# Patient Record
Sex: FEMALE | Race: NATIVE HAWAIIAN OR OTHER PACIFIC ISLANDER | NOT HISPANIC OR LATINO | URBAN - METROPOLITAN AREA
[De-identification: names, ages, dates, MRNs, and addresses within clinical notes are randomized per-mention and may not be internally consistent; named-entity substitution may affect disease eponyms.]

---

## 2023-06-08 ENCOUNTER — EMERGENCY (EMERGENCY)
Facility: HOSPITAL | Age: 28
LOS: 1 days | Discharge: HOME | End: 2023-06-08
Admitting: EMERGENCY MEDICINE
Payer: COMMERCIAL

## 2023-06-08 VITALS
WEIGHT: 125 LBS | SYSTOLIC BLOOD PRESSURE: 101 MMHG | TEMPERATURE: 99 F | OXYGEN SATURATION: 96 % | RESPIRATION RATE: 18 BRPM | DIASTOLIC BLOOD PRESSURE: 64 MMHG | HEIGHT: 61 IN | HEART RATE: 115 BPM

## 2023-06-08 VITALS
HEART RATE: 102 BPM | RESPIRATION RATE: 17 BRPM | TEMPERATURE: 99 F | DIASTOLIC BLOOD PRESSURE: 69 MMHG | SYSTOLIC BLOOD PRESSURE: 101 MMHG | OXYGEN SATURATION: 100 %

## 2023-06-08 LAB
FLUAV AG NPH QL: SIGNIFICANT CHANGE UP
FLUBV AG NPH QL: SIGNIFICANT CHANGE UP
RSV RNA NPH QL NAA+NON-PROBE: SIGNIFICANT CHANGE UP
SARS-COV-2 RNA SPEC QL NAA+PROBE: SIGNIFICANT CHANGE UP

## 2023-06-08 PROCEDURE — 87637 SARSCOV2&INF A&B&RSV AMP PRB: CPT

## 2023-06-08 PROCEDURE — 99283 EMERGENCY DEPT VISIT LOW MDM: CPT | Mod: 25

## 2023-06-08 PROCEDURE — 96372 THER/PROPH/DIAG INJ SC/IM: CPT

## 2023-06-08 PROCEDURE — 99284 EMERGENCY DEPT VISIT MOD MDM: CPT

## 2023-06-08 RX ORDER — IBUPROFEN 200 MG
600 TABLET ORAL ONCE
Refills: 0 | Status: COMPLETED | OUTPATIENT
Start: 2023-06-08 | End: 2023-06-08

## 2023-06-08 RX ORDER — PENICILLIN V POTASIUM 500 MG/1
1 TABLET OROPHARYNGEAL
Qty: 20 | Refills: 0
Start: 2023-06-08 | End: 2023-06-17

## 2023-06-08 RX ORDER — DEXAMETHASONE 0.5 MG/5ML
10 ELIXIR ORAL ONCE
Refills: 0 | Status: COMPLETED | OUTPATIENT
Start: 2023-06-08 | End: 2023-06-08

## 2023-06-08 RX ADMIN — Medication 10 MILLIGRAM(S): at 16:24

## 2023-06-08 RX ADMIN — Medication 600 MILLIGRAM(S): at 16:05

## 2023-06-08 NOTE — ED ADULT NURSE NOTE - NSFALLUNIVINTERV_ED_ALL_ED
Bed/Stretcher in lowest position, wheels locked, appropriate side rails in place/Call bell, personal items and telephone in reach/Instruct patient to call for assistance before getting out of bed/chair/stretcher/Non-slip footwear applied when patient is off stretcher/[place] to call system/Physically safe environment - no spills, clutter or unnecessary equipment/Purposeful proactive rounding/Room/bathroom lighting operational, light cord in reach

## 2023-06-08 NOTE — ED PROVIDER NOTE - OBJECTIVE STATEMENT
29 yo F with with no pmh c/o sore throat, f/c and bodyaches since last night. Pt states she is having pain with swallowing. Denies cp, sob, cough, n/v, drooling, voice changes.

## 2023-06-08 NOTE — ED ADULT NURSE NOTE - OBJECTIVE STATEMENT
28F no known medical hx presents c/o sore throat, fevers/chills and generalized body aches x1 day. +painful swallowing. denies n/vd, CP/SOB, cough/congestion or weakness. took tylenol prior to arrival. pt peaking in clear, complete sentences. RR easy, even, un-labored on room air

## 2023-06-08 NOTE — ED PROVIDER NOTE - PATIENT PORTAL LINK FT
You can access the FollowMyHealth Patient Portal offered by [facility] by registering at the following website: [email or web address]. By joining [employer]’s FollowMyHealth portal, you will also be able to view your health information using other applications (apps) compatible with our system.

## 2023-06-08 NOTE — ED PROVIDER NOTE - PHYSICAL EXAMINATION
CONSTITUTIONAL: Well-appearing;  in no apparent distress.   HEAD: Normocephalic; atraumatic.   EYES: PERRL; EOM intact; conjunctiva and sclera clear  ENT: normal nose; no rhinorrhea; +pharyngeal erythema, exudates, uvula midline  NECK: Supple; +cervical LAD   CARDIOVASCULAR: rrr,  RESPIRATORY: Breathing easily;   MSK: FROM at all extremities, normal tone   EXT: No cyanosis or edema; N/V intact  SKIN: Normal for age and race; warm; dry; good turgor; no apparent lesions or rash.

## 2023-06-08 NOTE — ED PROVIDER NOTE - NSFOLLOWUPINSTRUCTIONS_ED_ALL_ED_FT
Tonsillitis  An open mouth with the tongue lifted showing the tonsils.  Tonsillitis is an infection of the throat that causes the tonsils to become red, tender, and swollen. Tonsils are tissues in the back of your throat. Each tonsil has crevices (crypts). Tonsils normally work to protect the body from infection.    What are the causes?  Sudden (acute) tonsillitis may be caused by a virus or bacteria, including streptococcal bacteria. Long-lasting (chronic) tonsillitis occurs when the crypts of the tonsils become filled with pieces of food and bacteria, which makes it easy for the tonsils to become repeatedly infected.    Tonsillitis can be spread from person to person when it is caused by a virus or bacteria. It may be spread by inhaling droplets that are released with coughing or sneezing. You may also come into contact with viruses or bacteria on surfaces, such as cups or utensils.    What are the signs or symptoms?  Symptoms of this condition include:  A sore throat. This may include trouble swallowing.  White patches on the tonsils.  Swollen tonsils.  Fever.  Headache.  Tiredness.  Loss of appetite.  Snoring during sleep when you did not snore before.  Small, foul-smelling, yellowish-white pieces of material (tonsilloliths) that you occasionally cough up or spit out. These can cause you to have bad breath.  How is this diagnosed?  This condition is diagnosed with a physical exam. Diagnosis can be confirmed with the results of lab tests, including a throat culture.    How is this treated?  Treatment for this condition depends on the cause, but usually focuses on treating the symptoms associated with it. Treatment may include:  Medicines to relieve pain and manage fever.  Steroid medicines to reduce swelling.  Antibiotic medicines if the condition is caused by bacteria.  If episodes of tonsillitis are severe and frequent, your health care provider may recommend surgery to remove the tonsils (tonsillectomy).    Follow these instructions at home:  Medicines    Take over-the-counter and prescription medicines only as told by your health care provider.  If you were prescribed an antibiotic medicine, take it as told by your health care provider. Do not stop taking the antibiotic even if you start to feel better.  Eating and drinking    Drink enough fluid to keep your urine pale yellow.  While your throat is sore, eat soft or liquid foods, such as sherbet, soups, or soft, warm cereals, such as oatmeal or hot wheat cereal.  Drink warm liquids.  Eat frozen ice pops.  General instructions    Rest as much as possible and get plenty of sleep.  Gargle with a mixture of salt and water 3–4 times a day or as needed.  To make salt water, completely dissolve ½–1 tsp (3–6 g) of salt in 1 cup (237 mL) of warm water.  Do not swallow the mixture of salt and water.  Wash your hands regularly with soap and water for at least 20 seconds. If soap and water are not available, use hand .  Do not share cups, bottles, or other utensils until your symptoms have gone away.  Do not use any products that contain nicotine or tobacco. These products include cigarettes, chewing tobacco, and vaping devices, such as e-cigarettes. If you need help quitting, ask your health care provider.  Keep all follow-up visits. This is important.  Contact a health care provider if:  You notice large, tender lumps in your neck that were not there before.  You have a fever that does not go away after 2–3 days.  You develop a rash.  You cough up a green, yellow-brown, or bloody substance.  You cannot swallow liquids or food for 24 hours.  Only one of your tonsils is swollen.  Get help right away if:  You develop any new symptoms, such as vomiting, severe headache, stiff neck, chest pain, trouble breathing, or trouble swallowing.  You have severe throat pain along with drooling or voice changes.  You have severe pain that is not controlled with medicines.  You cannot fully open your mouth.  You develop redness, swelling, or severe pain anywhere in your neck.  Summary  Tonsillitis is an infection of the throat that causes the tonsils to become red, tender, and swollen. The most common symptom is pain in the throat.  Tonsillitis is most often caused by a virus or bacteria.  Get help right away if you develop any new symptoms, such as vomiting, severe headache, stiff neck, chest pain, or trouble breathing.  This information is not intended to replace advice given to you by your health care provider. Make sure you discuss any questions you have with your health care provider.

## 2023-06-08 NOTE — ED PROVIDER NOTE - CLINICAL SUMMARY MEDICAL DECISION MAKING FREE TEXT BOX
29 yo F with with no pmh c/o sore throat, f/c and bodyaches since last night. Pt states she is having pain with swallowing. Denies cp, sob, cough, n/v, drooling, voice changes. +pharyngeal erythema, exudates, uvula midline. Pt given decadron, will tx with pcn for tonsillitis

## 2023-06-12 DIAGNOSIS — J02.9 ACUTE PHARYNGITIS, UNSPECIFIED: ICD-10-CM

## 2023-06-12 DIAGNOSIS — Z20.822 CONTACT WITH AND (SUSPECTED) EXPOSURE TO COVID-19: ICD-10-CM

## 2023-06-12 DIAGNOSIS — M79.10 MYALGIA, UNSPECIFIED SITE: ICD-10-CM

## 2023-06-12 DIAGNOSIS — J03.90 ACUTE TONSILLITIS, UNSPECIFIED: ICD-10-CM
